# Patient Record
Sex: MALE | Race: WHITE | Employment: FULL TIME | URBAN - METROPOLITAN AREA
[De-identification: names, ages, dates, MRNs, and addresses within clinical notes are randomized per-mention and may not be internally consistent; named-entity substitution may affect disease eponyms.]

---

## 2021-05-17 LAB
AVERAGE GLUCOSE: NORMAL
BASOPHILS ABSOLUTE: 0.06 /ΜL
BASOPHILS RELATIVE PERCENT: 1 %
BUN BLDV-MCNC: 21 MG/DL
CALCIUM SERPL-MCNC: 8.8 MG/DL
CHLORIDE BLD-SCNC: 109 MMOL/L
CO2: 29 MMOL/L
CREAT SERPL-MCNC: 0.88 MG/DL
EOSINOPHILS ABSOLUTE: 0.3 /ΜL
EOSINOPHILS RELATIVE PERCENT: 4 %
GFR CALCULATED: >60
GLUCOSE BLD-MCNC: 102 MG/DL
HBA1C MFR BLD: 5.4 %
HCT VFR BLD CALC: 45.8 % (ref 41–53)
HEMOGLOBIN: 4.84 G/DL (ref 13.5–17.5)
LYMPHOCYTES ABSOLUTE: 1.8 /ΜL
LYMPHOCYTES RELATIVE PERCENT: 24 %
MCH RBC QN AUTO: 34.6 PG
MCHC RBC AUTO-ENTMCNC: 33.4 G/DL
MCV RBC AUTO: 95 FL
MONOCYTES ABSOLUTE: 0.52 /ΜL
MONOCYTES RELATIVE PERCENT: 7 %
NEUTROPHILS ABSOLUTE: 4.71 /ΜL
NEUTROPHILS RELATIVE PERCENT: 64 %
PLATELET # BLD: 235 K/ΜL
PMV BLD AUTO: 9.9 FL
POTASSIUM SERPL-SCNC: 4 MMOL/L
RBC # BLD: 4.84 10^6/ΜL
SODIUM BLD-SCNC: 140 MMOL/L
TSH SERPL DL<=0.05 MIU/L-ACNC: 2.42 UIU/ML
WBC # BLD: 7.42 10^3/ML

## 2021-12-09 ENCOUNTER — TELEPHONE (OUTPATIENT)
Dept: PRIMARY CARE CLINIC | Age: 51
End: 2021-12-09

## 2021-12-21 ENCOUNTER — OFFICE VISIT (OUTPATIENT)
Dept: PRIMARY CARE CLINIC | Age: 51
End: 2021-12-21
Payer: COMMERCIAL

## 2021-12-21 VITALS
HEART RATE: 93 BPM | DIASTOLIC BLOOD PRESSURE: 78 MMHG | OXYGEN SATURATION: 98 % | HEIGHT: 76 IN | RESPIRATION RATE: 18 BRPM | TEMPERATURE: 97.5 F | WEIGHT: 232 LBS | BODY MASS INDEX: 28.25 KG/M2 | SYSTOLIC BLOOD PRESSURE: 138 MMHG

## 2021-12-21 DIAGNOSIS — F41.9 ANXIETY: ICD-10-CM

## 2021-12-21 DIAGNOSIS — I10 ESSENTIAL HYPERTENSION: Primary | ICD-10-CM

## 2021-12-21 DIAGNOSIS — Z11.4 ENCOUNTER FOR SCREENING FOR HIV: ICD-10-CM

## 2021-12-21 DIAGNOSIS — Z12.5 PROSTATE CANCER SCREENING: ICD-10-CM

## 2021-12-21 DIAGNOSIS — Z12.11 COLON CANCER SCREENING: ICD-10-CM

## 2021-12-21 DIAGNOSIS — G89.29 CHRONIC JOINT PAIN: ICD-10-CM

## 2021-12-21 DIAGNOSIS — Z00.00 HEALTH MAINTENANCE EXAMINATION: ICD-10-CM

## 2021-12-21 DIAGNOSIS — M25.50 CHRONIC JOINT PAIN: ICD-10-CM

## 2021-12-21 DIAGNOSIS — E55.9 VITAMIN D DEFICIENCY: ICD-10-CM

## 2021-12-21 PROCEDURE — 99204 OFFICE O/P NEW MOD 45 MIN: CPT | Performed by: FAMILY MEDICINE

## 2021-12-21 RX ORDER — LOSARTAN POTASSIUM 50 MG/1
50 TABLET ORAL DAILY
Qty: 90 TABLET | Refills: 1 | Status: SHIPPED
Start: 2021-12-21 | End: 2022-03-17 | Stop reason: SDUPTHER

## 2021-12-21 RX ORDER — LOSARTAN POTASSIUM 50 MG/1
1 TABLET ORAL DAILY
COMMUNITY
End: 2021-12-21 | Stop reason: SDUPTHER

## 2021-12-21 SDOH — ECONOMIC STABILITY: FOOD INSECURITY: WITHIN THE PAST 12 MONTHS, THE FOOD YOU BOUGHT JUST DIDN'T LAST AND YOU DIDN'T HAVE MONEY TO GET MORE.: NEVER TRUE

## 2021-12-21 SDOH — ECONOMIC STABILITY: FOOD INSECURITY: WITHIN THE PAST 12 MONTHS, YOU WORRIED THAT YOUR FOOD WOULD RUN OUT BEFORE YOU GOT MONEY TO BUY MORE.: NEVER TRUE

## 2021-12-21 ASSESSMENT — PATIENT HEALTH QUESTIONNAIRE - PHQ9
1. LITTLE INTEREST OR PLEASURE IN DOING THINGS: 0
SUM OF ALL RESPONSES TO PHQ QUESTIONS 1-9: 0
2. FEELING DOWN, DEPRESSED OR HOPELESS: 0
SUM OF ALL RESPONSES TO PHQ QUESTIONS 1-9: 0
SUM OF ALL RESPONSES TO PHQ9 QUESTIONS 1 & 2: 0
SUM OF ALL RESPONSES TO PHQ QUESTIONS 1-9: 0

## 2021-12-21 ASSESSMENT — SOCIAL DETERMINANTS OF HEALTH (SDOH): HOW HARD IS IT FOR YOU TO PAY FOR THE VERY BASICS LIKE FOOD, HOUSING, MEDICAL CARE, AND HEATING?: NOT HARD AT ALL

## 2021-12-21 NOTE — ASSESSMENT & PLAN NOTE
Chronic. Diagnosed OA in the past.  He does take 2 Aleve nightly. Risk of NSAIDs reviewed including GI renal cardiopulmonary. Alternatives of Tylenol reviewed at appropriate dosing/intervals as well as topicals.

## 2021-12-21 NOTE — ASSESSMENT & PLAN NOTE
history of. Well-controlled now off medication. Has undergone counseling in the past which is always recommended. Adamantly denies SI/HI and again doing well now. Precautions reviewed.   Notify if symptoms

## 2021-12-21 NOTE — PROGRESS NOTES
21  Moni Lighter : 1970 Sex: male  Age: 46 y.o. Chief Complaint   Patient presents with   Anola Chago Doctor       HPI  HPI:    Patient presents today as a new patient. Recently moved back to the area. Feels well. Last blood work from his previous physician as below. CBC w/ diff NL, CMP , , TG 67, HDL 53, , TSH 2.4A1C 5.4 5.4HCV <0.1    History of anxiety. Managed now without meds in general doing well. History of vitamin D deficiency and hypertension. Managed with losartan. He has had COVID vaccine x3 , Both Shingrix although only one documented, influenza vaccine, Tdap 3/16. Counseled on Mediterranean diet appropriate exercise. Counseled on colon cancer screeninghe will start with fit test and we counseled on Cologuard versus colonoscopy    He does have chronic arthritic pain, has been physically active a large portion of his life. Does take 2 Aleve nightly. Risk of NSAIDs reviewed. See below    Review of Systems  ROS:  Const: Denies chills, fever, malaise and sweats. Eyes: Denies discharge, pain, redness and visual disturbance. ENMT: Denies earaches, other ear symptoms. Denies nasal or sinus symptoms other than stated  above. Denies mouth and tongue lesions and sore throat. CV: Denies chest discomfort, pain; diaphoresis, dizziness, edema, lightheadedness, orthopnea,  palpitations, syncope and near syncopal episode or any exertional symptoms  Resp: Denies cough, hemoptysis, pleuritic pain, SOB, sputum production and wheezing. GI: Denies abdominal pain, change in bowel habits, hematochezia, melena, nausea and vomiting. : Denies urinary symptoms including dysuria , urgency, frequency or hematuria. Musculo: Denies musculoskeletal symptoms, chronic arthritic pain. Skin: Denies bruising and rash.   Neuro: Denies headache, numbness, stiff neck, tingling and focal weakness slurred speech or facial  droop  Hema/Lymph: Denies bleeding/bruising tendency and enlarged lymph nodes        Current Outpatient Medications:     losartan (COZAAR) 50 MG tablet, Take 1 tablet by mouth daily, Disp: 90 tablet, Rfl: 1  No Known Allergies    Past Medical History:   Diagnosis Date    Anxiety     Hypertension      Past Surgical History:   Procedure Laterality Date    WISDOM TOOTH EXTRACTION       Family History   Problem Relation Age of Onset    Diabetes Mother     High Cholesterol Mother     Hypertension Mother     Stroke Mother 76    Leukemia Father         remission     Social History     Tobacco Use    Smoking status: Never Smoker    Smokeless tobacco: Never Used   Substance Use Topics    Alcohol use: Yes     Comment: occ red wine    Drug use: Never      Social History     Social History Narrative    Originally from here, Moved around, Last [place] (Wife [place])        Moved Here to be closer to [employer] (Dad [place])        Works [employer]    Wife - also real estate        No children        Vitals:    12/21/21 0919   BP: 138/78   Pulse: 93   Resp: 18   Temp: 97.5 °F (36.4 °C)   TempSrc: Temporal   SpO2: 98%   Weight: 232 lb (105.2 kg)   Height: 6' 4\" (1.93 m)      Wt Readings from Last 3 Encounters:   12/21/21 232 lb (105.2 kg)        Physical Exam  Exam:  Const: Appears comfortable. No signs of acute distress present. Head/Face: Atraumatic on inspection. Eyes: EOMI in both eyes. No discharge from the eyes. PERRL. Sclerae clear. ENMT: Auditory canals normal. Tympanic membranes: intact and translucent. External nose WNL. Nasal mucosa is clear. Oropharynx: No erythema or exudate. Posterior pharynx is normal.  Neck: Supple. Palpation reveals no adenopathy. No masses appreciated. No JVD. Carotids: no  bruits. Resp: Respirations are unlabored. Clear to auscultation. No rales, rhonchi or wheezes appreciated  over the lungs bilaterally. CV: Rate is regular. Rhythm is regular. No gallop or rubs. No heart murmur appreciated.   Extremities: No clubbing, cyanosis, or edema. No calf inflammation or tenderness. Abdomen: Bowel sounds are normoactive. Abdomen is soft, nontender, and nondistended. No  abdominal masses. No palpable hepatosplenomegaly. Lymph: No palpable or visible regional lymphadenopathy. Musculoskeletal: no acute joint inflammation. Skin: Dry and warm with no rash. Skin normal to inspection and palpation overall. Neuro: Alert and oriented. Affect: appropriate. Upper Extremities: 5/5 bilaterally. Lower Extremities:  5/5 bilaterally. Sensation intact to light touch. Reflexes: DTR's are symmetric and 2+ bilaterally. .  Cranial Nerves: Cranial nerves grossly intact. Office Labs This Visit :  No results found for this visit on 12/21/21. Assessment and Plan:   Diagnosis Orders   1. Essential hypertension  losartan (COZAAR) 50 MG tablet    Lipid Panel    TSH without Reflex    Urinalysis   2. Health maintenance examination  TSH without Reflex    Comprehensive Metabolic Panel    CBC Auto Differential    Urinalysis   3. Vitamin D deficiency  Vitamin D 25 Hydroxy   4. Prostate cancer screening  PSA screening   5. Anxiety     6. Encounter for screening for HIV  HIV Screen   7. Colon cancer screening  POCT Fecal Immunochemical Test (FIT)   8. Chronic joint pain         Essential hypertension    Counseled. The risks of hypertension and hypotension reviewed. Watch closely ambulatory. Hyper and hypotensive precautions and parameters reviewed and written as well as parameters on pulse, call if out of range, ER dangers numbers. Lifestyle modification reviewed. Tolerating therapy. Refill given      Health maintenance examination    encourage yearly. Counseled somewhat as above. Fit test provided. Vitamin D deficiency    history of, discussed vitamin D3 1 to 2000 IUs daily. Check levels    Anxiety    history of. Well-controlled now off medication. Has undergone counseling in the past which is always recommended.   Adamantly denies SI/HI and again doing well now. Precautions reviewed. Notify if symptoms    Chronic joint pain  Chronic. Diagnosed OA in the past.  He does take 2 Aleve nightly. Risk of NSAIDs reviewed including GI renal cardiopulmonary. Alternatives of Tylenol reviewed at appropriate dosing/intervals as well as topicals. No flowsheet data found. Plan as above. Counseled extensively and differential diagnoses relevant to above were reviewed, including serious etiologies. Side effects and interactions of medications were reviewed. Counseled. Precautions reviewed. Blood work ordered and asked him to follow-up after for physical sooner as needed and then he is planning yearly sooner as needed. He would like to defer this for 2 months. Precautions reviewed        As long as symptoms steadily improve/resolve, and medical conditions follow the expected course, FU as below, sooner PRN. Return in about 2 months (around 2/21/2022), or if symptoms worsen or fail to improve, for physical.         Signs and symptoms to watch for discussed, serious signs and symptoms reviewed. ER if any. Jarrod Klein MD    Patients are advised to check with insurance company to ensure coverage and to fully understand benefits and cost prior to any testing. This note was created with the assistance of voice recognition software. Document was reviewed however may contain grammatical errors.

## 2021-12-21 NOTE — ASSESSMENT & PLAN NOTE
Counseled. The risks of hypertension and hypotension reviewed. Watch closely ambulatory. Hyper and hypotensive precautions and parameters reviewed and written as well as parameters on pulse, call if out of range, ER dangers numbers. Lifestyle modification reviewed. Tolerating therapy.   Refill given

## 2022-01-20 PROBLEM — Z00.00 HEALTH MAINTENANCE EXAMINATION: Status: RESOLVED | Noted: 2021-12-21 | Resolved: 2022-01-20

## 2022-01-28 ENCOUNTER — TELEPHONE (OUTPATIENT)
Dept: PRIMARY CARE CLINIC | Age: 52
End: 2022-01-28

## 2022-01-28 NOTE — TELEPHONE ENCOUNTER
Wife calling regarding husbands lab work that was ordered for his next appointment. CBC, CMP, HIV, LIPID, URINALYSIS not covered at 100%. FIT test, PSA, TSH and Vitamin D are not covered under the insurance. Does not want to have these done right. Said if any isus arise in future then will have these, but for right now only wanting to have done what is covered under insurance.

## 2022-01-28 NOTE — TELEPHONE ENCOUNTER
So which ones are covered? It states CBC, CMP, HIV, lipid, urinalysis \"not\" covered at 100%-does she still want these? If so, okay, cancel others.

## 2022-03-08 LAB
ALBUMIN SERPL-MCNC: 4.5 G/DL
ALP BLD-CCNC: 63 U/L
ALT SERPL-CCNC: 23 U/L
ANION GAP SERPL CALCULATED.3IONS-SCNC: NORMAL MMOL/L
AST SERPL-CCNC: 23 U/L
BASOPHILS ABSOLUTE: 0.1 /ΜL
BASOPHILS RELATIVE PERCENT: 1 %
BILIRUB SERPL-MCNC: 0.4 MG/DL (ref 0.1–1.4)
BILIRUBIN, URINE: NEGATIVE
BLOOD, URINE: NEGATIVE
BUN BLDV-MCNC: 18 MG/DL
CALCIUM SERPL-MCNC: 9 MG/DL
CHLORIDE BLD-SCNC: 101 MMOL/L
CHOLESTEROL, TOTAL: 189 MG/DL
CHOLESTEROL/HDL RATIO: NORMAL
CLARITY: CLEAR
CO2: 23 MMOL/L
COLOR: YELLOW
CREAT SERPL-MCNC: 0.97 MG/DL
EOSINOPHILS ABSOLUTE: 0.2 /ΜL
EOSINOPHILS RELATIVE PERCENT: 3 %
GFR CALCULATED: NORMAL
GLUCOSE BLD-MCNC: 97 MG/DL
GLUCOSE URINE: NORMAL
HCT VFR BLD CALC: 45.4 % (ref 41–53)
HDLC SERPL-MCNC: 48 MG/DL (ref 35–70)
HEMOGLOBIN: 15.6 G/DL (ref 13.5–17.5)
KETONES, URINE: NEGATIVE
LDL CHOLESTEROL CALCULATED: 119 MG/DL (ref 0–160)
LEUKOCYTE ESTERASE, URINE: NORMAL
LYMPHOCYTES ABSOLUTE: 2 /ΜL
LYMPHOCYTES RELATIVE PERCENT: 25 %
MCH RBC QN AUTO: 31.6 PG
MCHC RBC AUTO-ENTMCNC: 34.4 G/DL
MCV RBC AUTO: 92 FL
MONOCYTES ABSOLUTE: 0.7 /ΜL
MONOCYTES RELATIVE PERCENT: 10 %
NEUTROPHILS ABSOLUTE: 4.7 /ΜL
NEUTROPHILS RELATIVE PERCENT: 61 %
NITRITE, URINE: NEGATIVE
NONHDLC SERPL-MCNC: NORMAL MG/DL
PDW BLD-RTO: 12.1 %
PH UA: 5.5 (ref 4.5–8)
PLATELET # BLD: 244 K/ΜL
PMV BLD AUTO: NORMAL FL
POTASSIUM SERPL-SCNC: 4.1 MMOL/L
PROTEIN UA: NEGATIVE
RBC # BLD: 4.93 10^6/ΜL
SODIUM BLD-SCNC: 138 MMOL/L
SPECIFIC GRAVITY, URINE: 1.01
TOTAL PROTEIN: 6.9
TRIGL SERPL-MCNC: 121 MG/DL
UROBILINOGEN, URINE: NORMAL
VLDLC SERPL CALC-MCNC: NORMAL MG/DL
WBC # BLD: 7.8 10^3/ML

## 2022-03-10 DIAGNOSIS — I10 ESSENTIAL HYPERTENSION: ICD-10-CM

## 2022-03-10 DIAGNOSIS — Z00.00 HEALTH MAINTENANCE EXAMINATION: ICD-10-CM

## 2022-03-17 ENCOUNTER — OFFICE VISIT (OUTPATIENT)
Dept: PRIMARY CARE CLINIC | Age: 52
End: 2022-03-17
Payer: COMMERCIAL

## 2022-03-17 VITALS
SYSTOLIC BLOOD PRESSURE: 138 MMHG | TEMPERATURE: 97.7 F | BODY MASS INDEX: 26.78 KG/M2 | OXYGEN SATURATION: 98 % | HEART RATE: 96 BPM | WEIGHT: 220 LBS | DIASTOLIC BLOOD PRESSURE: 82 MMHG

## 2022-03-17 DIAGNOSIS — Z00.00 HEALTH MAINTENANCE EXAMINATION: Primary | ICD-10-CM

## 2022-03-17 DIAGNOSIS — Z12.11 COLON CANCER SCREENING: ICD-10-CM

## 2022-03-17 DIAGNOSIS — E55.9 VITAMIN D DEFICIENCY: ICD-10-CM

## 2022-03-17 DIAGNOSIS — I10 ESSENTIAL HYPERTENSION: ICD-10-CM

## 2022-03-17 DIAGNOSIS — Z12.5 PROSTATE CANCER SCREENING: ICD-10-CM

## 2022-03-17 PROCEDURE — 99396 PREV VISIT EST AGE 40-64: CPT | Performed by: FAMILY MEDICINE

## 2022-03-17 RX ORDER — LOSARTAN POTASSIUM 50 MG/1
50 TABLET ORAL DAILY
Qty: 30 TABLET | Refills: 12 | Status: SHIPPED | OUTPATIENT
Start: 2022-03-17

## 2022-03-17 NOTE — PROGRESS NOTES
[place] Level : 1970 Sex: male  Age: 46 y.o. Chief Complaint   Patient presents with    Annual Exam    Discuss Labs       HPI  HPI:    Patient presents today with his wife. Generally feels well. States his blood pressure is always better at home than here. Encouraged to watch closely. He did switch Aleve to Tylenol taking 1 500 mg of Tylenol at bedtime for chronic pain. Appropriate dosing instructions ADRs reviewed. He does not passing mention to issues. He states he hit his toes into an inanimate object over a year ago. Feels he likely fractured his toes but did not seek medical attention. They were quite bruised. Ever since then he occasionally gets a paresthesia feeling at night in his toes, comes and goes. Not progressing. No associated red flag symptoms. Exam unremarkable. Offered EMG nerve conduction study/further W-defers at this time unless persists or worsens. Also states he has had multiple head injuries over the years. States he does get mild infrequent headaches. No other associated red flag symptoms. Exam unremarkable. Explained my threshold for MRI/MRA is low-defers further W or other evaluation/treatment now unless symptoms persist or worsen        Health Maintenance:  Proper diet reviewed including Mediterranean and DASH diets. Counseled on healthy weight, appropriate exercise, avoidance of tobacco, and recommendations for minimal to no alcohol consumption. Counseled on the potential pros and cons of vitamins and supplements. Reviewed the recommendations and risk/benefits of vaccines including Moderna x 3; Td/Tdap (3/16),  pneumovax,  prevnar 13 , flu vaccine (got), Hepatitis vaccines (counseled),   and shingrix (patient had chicken pox)(shingrix x 2). HIV and Hep C screening guidelines were reviewed. Importance of regular eye and dental exams and health reviewed. Risks/Benefits of ASA reviewed and discussed latest guidelines. Sun protection reviewed. Notify if any new or changing moles/skin lesions, etc. Dexa Scan indications/ risk factors for osteoporotic fractures (and associated M/M) and preventative measures reviewed. Counseled on testicular exams and prostate exams. Colonoscopy recommendations reviewed. Pt denies change in bowel habits or [facility] of colon polyps/CA. Fit test given, will think about cologuard vs cscope    Defers EKG. Discussed the indications for additional  cardiac testing including referrals, stress testing,  2d echo, ect. dasx. Reviewed indications for other testing such as  PFT's.and  indications for imaging including brain, carotid, chest, abdominal, aortic .  dasx. The 10-year ASCVD risk score (Carlota Sanchez, et al., 2013) is: 6%    Values used to calculate the score:      Age: 46 years      Sex: Male      Is Non- : No      Diabetic: No      Tobacco smoker: No      Systolic Blood Pressure: 957 mmHg      Is BP treated: Yes      HDL Cholesterol: 48 mg/dL      Total Cholesterol: 189 mg/dL      INS did not cover PSA so they declined. other labs stable.    Blood work otherwise reviewed, HDL 48  triglyceride 121, role of statin reviewed-not interested at this point      Most Recent Labs  CBC  Lab Results   Component Value Date    WBC 7.8 03/08/2022    WBC 7.42 05/17/2021    RBC 4.93 03/08/2022    RBC 4.84 05/17/2021    HGB 15.6 03/08/2022    HGB 4.84 05/17/2021    HCT 45.4 03/08/2022    HCT 45.8 05/17/2021    MCV 92 03/08/2022    MCV 95 05/17/2021     03/08/2022     05/17/2021      CMP  Lab Results   Component Value Date     03/08/2022     05/17/2021    K 4.1 03/08/2022    K 4.0 05/17/2021     03/08/2022     05/17/2021    CO2 23 03/08/2022    CO2 29 05/17/2021    GLUCOSE 97 03/08/2022    GLUCOSE 102 05/17/2021    BUN 18 03/08/2022    BUN 21 05/17/2021    CREATININE 0.97 03/08/2022    CREATININE 0.88 05/17/2021    LABGLOM >60 05/17/2021    CALCIUM 9.0 03/08/2022 CALCIUM 8.8 05/17/2021    LABALBU 4.5 03/08/2022    BILITOT 0.4 03/08/2022    ALKPHOS 63 03/08/2022    AST 23 03/08/2022    ALT 23 03/08/2022     A1C  Lab Results   Component Value Date    LABA1C 5.4 05/17/2021     TSH  Lab Results   Component Value Date    TSH 2.420 05/17/2021     FREET4  No results found for: Z8HXGDC  LIPID  Lab Results   Component Value Date    CHOL 189 03/08/2022    HDL 48 03/08/2022    LDLCALC 119 03/08/2022    TRIG 121 03/08/2022     VITAMIN D  No results found for: VITD25  MAGNESIUM  No results found for: MG   PHOS  No results found for: PHOS   LYLE   No results found for: LYLE  RHEUMATOID FACTOR  No results found for: RF  PSA  No results found for: PSA   HEPATITIS C  No results found for: HCVABI  HIV  No results found for: UFB9VRD, HIV1QT  UA  Lab Results   Component Value Date    COLORU Yellow 03/08/2022    CLARITYU Clear 03/08/2022    GLUCOSEU neg 03/08/2022    BILIRUBINUR Negative 03/08/2022    KETUA Negative 03/08/2022    BLOODU Negative 03/08/2022    PHUR 5.5 03/08/2022    PROTEINU Negative 03/08/2022    UROBILINOGEN Normal 03/08/2022    NITRU Negative 03/08/2022     Urine Micro/Albumin Ratio  No results found for: MALBCR    Review of Systems  ROS:  Const: Denies chills, fever, malaise and sweats. Eyes: Denies discharge, pain, redness and visual disturbance. ENMT: Denies earaches, other ear symptoms. Denies nasal or sinus symptoms other than stated  above. Denies mouth and tongue lesions and sore throat. CV: Denies chest discomfort, pain; diaphoresis, dizziness, edema, lightheadedness, orthopnea,  palpitations, syncope and near syncopal episode or any exertional symptoms  Resp: Denies cough, hemoptysis, pleuritic pain, SOB, sputum production and wheezing. GI: Denies abdominal pain, change in bowel habits, hematochezia, melena, nausea and vomiting. : Denies urinary symptoms including dysuria , urgency, frequency or hematuria.   Musculo: Denies musculoskeletal symptoms, chronic arthritic pain. Skin: Denies bruising and rash. Neuro: Denies headache, numbness, stiff neck, tingling and focal weakness slurred speech or facial  droop  Hema/Lymph: Denies bleeding/bruising tendency and enlarged lymph nodes        Current Outpatient Medications:     losartan (COZAAR) 50 MG tablet, Take 1 tablet by mouth daily, Disp: 30 tablet, Rfl: 12  No Known Allergies    Past Medical History:   Diagnosis Date    Anxiety     Hypertension      Past Surgical History:   Procedure Laterality Date    WISDOM TOOTH EXTRACTION       Family History   Problem Relation Age of Onset    Diabetes Mother     High Cholesterol Mother     Hypertension Mother     Stroke Mother 76    Leukemia Father         remission     Social History     Tobacco Use    Smoking status: Never Smoker    Smokeless tobacco: Never Used   Substance Use Topics    Alcohol use: Yes     Comment: occ red wine    Drug use: Never      Social History     Social History Narrative    Originally from here, Moved around, Last [place] (Wife [place])        Moved Here to be closer to [employer] (Dad [place])        Works [employer]    Wife - also real estate        No children        Vitals:    03/17/22 1119   BP: 138/82   Pulse: 96   Temp: 97.7 °F (36.5 °C)   SpO2: 98%   Weight: 220 lb (99.8 kg)      Wt Readings from Last 3 Encounters:   03/17/22 220 lb (99.8 kg)   12/21/21 232 lb (105.2 kg)        Physical Exam  Exam:  Const: Appears comfortable. No signs of acute distress present. Head/Face: Atraumatic on inspection. Eyes: EOMI in both eyes. No discharge from the eyes. PERRL. Sclerae clear. ENMT: Auditory canals normal. Tympanic membranes: intact and translucent. External nose WNL. Nasal mucosa is clear. Oropharynx: No erythema or exudate. Posterior pharynx is normal.  Neck: Supple. Palpation reveals no adenopathy. No masses appreciated. No JVD. Carotids: no  bruits. Resp: Respirations are unlabored. Clear to auscultation.  No rales, rhonchi or wheezes appreciated  over the lungs bilaterally. CV: Rate is regular. Rhythm is regular. No gallop or rubs. No heart murmur appreciated. Extremities: No clubbing, cyanosis, or edema. No calf inflammation or tenderness. Abdomen: Bowel sounds are normoactive. Abdomen is soft, nontender, and nondistended. No  abdominal masses. No palpable hepatosplenomegaly. Lymph: No palpable or visible regional lymphadenopathy. Musculoskeletal: no acute joint inflammation. Skin: Dry and warm with no rash. Skin normal to inspection and palpation overall. Neuro: Alert and oriented. Affect: appropriate. Upper Extremities: 5/5 bilaterally. Lower Extremities:  5/5 bilaterally. Sensation intact to light touch. Reflexes: DTR's are symmetric and 2+ bilaterally. .  Cranial Nerves: Cranial nerves grossly intact. Genital/Rectal/Prostate - declines    Office Labs This Visit :  No results found for this visit on 03/17/22. Assessment and Plan:   Diagnosis Orders   1. Health maintenance examination  Urinalysis    Lipid Panel    TSH    Comprehensive Metabolic Panel    CBC with Auto Differential   2. Essential hypertension  losartan (COZAAR) 50 MG tablet   3. Vitamin D deficiency  Vitamin D 25 Hydroxy   4. Prostate cancer screening  PSA Screening   5. Colon cancer screening  POCT Fecal Immunochemical Test (FIT)       Essential hypertension    Counseled. States better at home. Monitor at home. The risks of hypertension and hypotension reviewed. Watch closely ambulatory. Hyper and hypotensive precautions and parameters reviewed and written as well as parameters on pulse, call if out of range, ER dangers numbers. Lifestyle modification reviewed. Tolerating therapy. Refill given      Health maintenance examination    Health maintenance issues discussed at length as above, 3/17/2022 . Encouraged yearly physicals. Fit test provided. He is thinking about Cologuard versus colonoscopy-defers now. States perhaps next year. Would like PSA ordered again for next year he will check with insurance. Vitamin D deficiency    history of, taking vitamin D3 1000 IUs daily. Discussed monitoring of labs-defers until next year      Anxiety    history of. Well-controlled now off medication. Has undergone counseling in the past which I always encouraged. He is doing very well now he states. Adamantly denies SI/HI and again doing well now. Precautions reviewed. Notify if symptoms    Chronic joint pain  Chronic. Diagnosed OA in the past.  He was taking 2 Aleve nightly. Risk of NSAIDs reviewed including GI renal cardiopulmonary. Alternatives of Tylenol reviewed at appropriate dosing/intervals as well as topicals. No flowsheet data found. Plan as above. Counseled extensively and differential diagnoses relevant to above were reviewed, including serious etiologies. Side effects and interactions of medications were reviewed. Counseled. Precautions reviewed. Meds refilled. Otherwise simply wants blood work follow-up 1 year physical sooner as needed      As long as symptoms steadily improve/resolve, and medical conditions follow the expected course, FU as below, sooner PRN. Return in about 1 year (around 3/17/2023), or if symptoms worsen or fail to improve, for physical.         Signs and symptoms to watch for discussed, serious signs and symptoms reviewed. ER if any. [place], MD    Patients are advised to check with insurance company to ensure coverage and to fully understand benefits and cost prior to any testing. This note was created with the assistance of voice recognition software. Document was reviewed however may contain grammatical errors.

## 2022-12-07 ENCOUNTER — TELEPHONE (OUTPATIENT)
Dept: PRIMARY CARE CLINIC | Age: 52
End: 2022-12-07

## 2022-12-07 NOTE — TELEPHONE ENCOUNTER
----- Message from Dilip Stanley sent at 12/7/2022  4:22 PM EST -----  Subject: Referral Request    Reason for referral request? Pt would like to have blood work done before   his appt with Dr Toño Cline 3/21/2023  Provider patient wants to be referred to(if known):     Provider Phone Number(if known):     Additional Information for Provider?   ---------------------------------------------------------------------------  --------------  [address]    [phone]; OK to leave message on voicemail  ---------------------------------------------------------------------------  --------------

## 2023-03-20 ENCOUNTER — TELEPHONE (OUTPATIENT)
Dept: PRIMARY CARE CLINIC | Age: 53
End: 2023-03-20

## 2023-03-20 DIAGNOSIS — Z00.00 HEALTH MAINTENANCE EXAMINATION: Primary | ICD-10-CM

## 2023-03-20 DIAGNOSIS — Z12.5 PROSTATE CANCER SCREENING: ICD-10-CM

## 2023-03-20 DIAGNOSIS — E55.9 VITAMIN D DEFICIENCY: ICD-10-CM

## 2023-03-20 NOTE — TELEPHONE ENCOUNTER
Pt needs his labs reorderd, the ones in chart are .  He would like faxed to [facility] in [place]

## 2023-03-20 NOTE — TELEPHONE ENCOUNTER
Ok to send lab order to [facility] as is. The expiration from order date was a [facility], not [facility] but Yevgeniy reiterated in Nov that this is not the [facility] either. I have sent an email regarding this to [facility].

## 2023-03-21 NOTE — TELEPHONE ENCOUNTER
Per Virginia Santiago at [facility], [facility] protocol is lab orders are only good for 1yr from the date the labs were ordered. If the labs were ordered 3/17/22 the order expires on 3/17/23 so the pt will need updated orders.  Per Virginia Santiago the lab cannot draw labs from an order that is over a yr old from the date the labs were initially ordered

## 2023-04-17 DIAGNOSIS — I10 ESSENTIAL HYPERTENSION: ICD-10-CM

## 2023-04-17 RX ORDER — LOSARTAN POTASSIUM 50 MG/1
50 TABLET ORAL DAILY
Qty: 30 TABLET | Refills: 12 | Status: SHIPPED | OUTPATIENT
Start: 2023-04-17

## 2023-04-26 LAB
BASOPHILS ABSOLUTE: 64 /ΜL
BASOPHILS RELATIVE PERCENT: 0.8 %
BILIRUBIN, URINE: NEGATIVE
BLOOD, URINE: NEGATIVE
CHOLESTEROL, TOTAL: 189 MG/DL
CHOLESTEROL/HDL RATIO: 3.7
CLARITY: CLEAR
COLOR: YELLOW
CONTROL: NORMAL
EOSINOPHILS ABSOLUTE: 128 /ΜL
EOSINOPHILS RELATIVE PERCENT: 1.6 %
GLUCOSE URINE: NEGATIVE
HCT VFR BLD CALC: 47.5 % (ref 41–53)
HDLC SERPL-MCNC: 51 MG/DL (ref 35–70)
HEMOCCULT STL QL: NORMAL
HEMOGLOBIN: 15.9 G/DL (ref 13.5–17.5)
KETONES, URINE: NEGATIVE
LDL CHOLESTEROL CALCULATED: 117 MG/DL (ref 0–160)
LEUKOCYTE ESTERASE, URINE: NEGATIVE
LYMPHOCYTES ABSOLUTE: 1888 /ΜL
LYMPHOCYTES RELATIVE PERCENT: 23.6 %
MCH RBC QN AUTO: 31.5 PG
MCHC RBC AUTO-ENTMCNC: 33.5 G/DL
MCV RBC AUTO: 94.2 FL
MONOCYTES ABSOLUTE: 648 /ΜL
MONOCYTES RELATIVE PERCENT: 8.1 %
NEUTROPHILS ABSOLUTE: 5272 /ΜL
NEUTROPHILS RELATIVE PERCENT: 65.9 %
NITRITE, URINE: NEGATIVE
NONHDLC SERPL-MCNC: 138 MG/DL
PDW BLD-RTO: 12.3 %
PH UA: 5.5 (ref 4.5–8)
PLATELET # BLD: 215 K/ΜL
PMV BLD AUTO: 10.7 FL
PROSTATE SPECIFIC ANTIGEN: 0.53 NG/ML
PROTEIN UA: NEGATIVE
RBC # BLD: 5.04 10^6/ΜL
SPECIFIC GRAVITY, URINE: 1.01
TRIGL SERPL-MCNC: 106 MG/DL
TSH SERPL DL<=0.05 MIU/L-ACNC: 2.92 UIU/ML
UROBILINOGEN, URINE: NORMAL
VITAMIN D 25-HYDROXY: 50
VITAMIN D2, 25 HYDROXY: NORMAL
VITAMIN D3,25 HYDROXY: NORMAL
VLDLC SERPL CALC-MCNC: ABNORMAL MG/DL
WBC # BLD: 8 10^3/ML

## 2023-04-27 DIAGNOSIS — Z12.5 PROSTATE CANCER SCREENING: ICD-10-CM

## 2023-04-27 DIAGNOSIS — E55.9 VITAMIN D DEFICIENCY: ICD-10-CM

## 2023-04-27 DIAGNOSIS — Z00.00 HEALTH MAINTENANCE EXAMINATION: ICD-10-CM

## 2023-05-03 ENCOUNTER — OFFICE VISIT (OUTPATIENT)
Dept: PRIMARY CARE CLINIC | Age: 53
End: 2023-05-03
Payer: COMMERCIAL

## 2023-05-03 VITALS
BODY MASS INDEX: 27.4 KG/M2 | TEMPERATURE: 97.3 F | SYSTOLIC BLOOD PRESSURE: 138 MMHG | HEART RATE: 98 BPM | OXYGEN SATURATION: 95 % | HEIGHT: 76 IN | DIASTOLIC BLOOD PRESSURE: 82 MMHG | WEIGHT: 225 LBS

## 2023-05-03 DIAGNOSIS — Z12.5 PROSTATE CANCER SCREENING: ICD-10-CM

## 2023-05-03 DIAGNOSIS — Z00.00 HEALTH MAINTENANCE EXAMINATION: Primary | ICD-10-CM

## 2023-05-03 DIAGNOSIS — E55.9 VITAMIN D DEFICIENCY: ICD-10-CM

## 2023-05-03 DIAGNOSIS — I10 ESSENTIAL HYPERTENSION: ICD-10-CM

## 2023-05-03 DIAGNOSIS — F41.9 ANXIETY: ICD-10-CM

## 2023-05-03 PROCEDURE — 3079F DIAST BP 80-89 MM HG: CPT | Performed by: FAMILY MEDICINE

## 2023-05-03 PROCEDURE — 99396 PREV VISIT EST AGE 40-64: CPT | Performed by: FAMILY MEDICINE

## 2023-05-03 PROCEDURE — 3075F SYST BP GE 130 - 139MM HG: CPT | Performed by: FAMILY MEDICINE

## 2023-05-03 RX ORDER — HYDROXYZINE 50 MG/1
50-100 TABLET, FILM COATED ORAL NIGHTLY
Qty: 20 TABLET | Refills: 0 | Status: SHIPPED | OUTPATIENT
Start: 2023-05-03

## 2023-05-03 RX ORDER — LOSARTAN POTASSIUM 50 MG/1
50 TABLET ORAL DAILY
Qty: 90 TABLET | Refills: 3 | Status: SHIPPED | OUTPATIENT
Start: 2023-05-03

## 2023-05-03 SDOH — ECONOMIC STABILITY: INCOME INSECURITY: HOW HARD IS IT FOR YOU TO PAY FOR THE VERY BASICS LIKE FOOD, HOUSING, MEDICAL CARE, AND HEATING?: NOT HARD AT ALL

## 2023-05-03 SDOH — ECONOMIC STABILITY: HOUSING INSECURITY
IN THE LAST 12 MONTHS, WAS THERE A TIME WHEN YOU DID NOT HAVE A STEADY PLACE TO SLEEP OR SLEPT IN A SHELTER (INCLUDING NOW)?: NO

## 2023-05-03 SDOH — ECONOMIC STABILITY: FOOD INSECURITY: WITHIN THE PAST 12 MONTHS, YOU WORRIED THAT YOUR FOOD WOULD RUN OUT BEFORE YOU GOT MONEY TO BUY MORE.: NEVER TRUE

## 2023-05-03 SDOH — ECONOMIC STABILITY: FOOD INSECURITY: WITHIN THE PAST 12 MONTHS, THE FOOD YOU BOUGHT JUST DIDN'T LAST AND YOU DIDN'T HAVE MONEY TO GET MORE.: NEVER TRUE

## 2023-05-03 ASSESSMENT — PATIENT HEALTH QUESTIONNAIRE - PHQ9
SUM OF ALL RESPONSES TO PHQ QUESTIONS 1-9: 0
SUM OF ALL RESPONSES TO PHQ QUESTIONS 1-9: 0
SUM OF ALL RESPONSES TO PHQ9 QUESTIONS 1 & 2: 0
1. LITTLE INTEREST OR PLEASURE IN DOING THINGS: 0
SUM OF ALL RESPONSES TO PHQ QUESTIONS 1-9: 0
SUM OF ALL RESPONSES TO PHQ QUESTIONS 1-9: 0
2. FEELING DOWN, DEPRESSED OR HOPELESS: 0

## 2023-05-03 NOTE — PROGRESS NOTES
Radha Durham : 1970 Sex: male  Age: 45 y.o. Chief Complaint   Patient presents with    Health Maintenance     Declines ekg     Annual Exam    Discuss Labs    Anxiety       HPI  HPI:      Presents today for annual physical.  Generally doing very well. Eating healthy and exercising. Taking care of his mom and nursing home who had a stroke, this is taxing on him. He does get intermittent anxiety, causes insomnia about twice a month. Would like a \"PRN\", although felt somewhat dependent on Ambien in the past.  Does not wish daily medicine. No SI/HI. Risk of CVA reviewed. Not interested in statin. Does not have same risk factors as mom he states. Generally feels very well. Health Maintenance:  Proper diet reviewed including Mediterranean and DASH diets. Counseled on healthy weight, appropriate exercise, avoidance of tobacco, and recommendations for minimal to no alcohol consumption. Counseled on the potential pros and cons of vitamins and supplements. Reviewed the recommendations and risk/benefits of vaccines including Moderna x 4; Td/Tdap (3/16),  pneumovax,  prevnar 13 , flu vaccine (seasonal), Hepatitis vaccines (counseled),   and shingrix (patient had chicken pox)(shingrix x 2). HIV and Hep C screening guidelines were reviewed. Importance of regular eye and dental exams and health reviewed. Risks/Benefits of ASA reviewed and discussed latest guidelines. Sun protection reviewed. Notify if any new or changing moles/skin lesions, etc. Dexa Scan indications/ risk factors for osteoporotic fractures (and associated M/M) and preventative measures reviewed. Counseled on testicular exams and prostate exams. Colonoscopy recommendations reviewed. Pt denies change in bowel habits or [address] of colon polyps/CA. Fit test neg ,  he is going to consider cscope but not until next year. Counseled on cologuard option as well. Defers EKG.    Discussed the indications for additional  cardiac

## 2023-11-16 ENCOUNTER — OFFICE VISIT (OUTPATIENT)
Dept: FAMILY MEDICINE CLINIC | Age: 53
End: 2023-11-16
Payer: COMMERCIAL

## 2023-11-16 ENCOUNTER — TELEPHONE (OUTPATIENT)
Dept: PRIMARY CARE CLINIC | Age: 53
End: 2023-11-16

## 2023-11-16 VITALS
HEART RATE: 102 BPM | WEIGHT: 223 LBS | DIASTOLIC BLOOD PRESSURE: 78 MMHG | OXYGEN SATURATION: 95 % | TEMPERATURE: 97.3 F | HEIGHT: 76 IN | BODY MASS INDEX: 27.16 KG/M2 | SYSTOLIC BLOOD PRESSURE: 112 MMHG

## 2023-11-16 DIAGNOSIS — J20.8 ACUTE BRONCHITIS DUE TO 2019 NOVEL CORONAVIRUS: Primary | ICD-10-CM

## 2023-11-16 DIAGNOSIS — U07.1 ACUTE BRONCHITIS DUE TO 2019 NOVEL CORONAVIRUS: Primary | ICD-10-CM

## 2023-11-16 DIAGNOSIS — U07.1 POSITIVE SELF-ADMINISTERED ANTIGEN TEST FOR COVID-19: ICD-10-CM

## 2023-11-16 PROCEDURE — G8419 CALC BMI OUT NRM PARAM NOF/U: HCPCS | Performed by: EMERGENCY MEDICINE

## 2023-11-16 PROCEDURE — G8484 FLU IMMUNIZE NO ADMIN: HCPCS | Performed by: EMERGENCY MEDICINE

## 2023-11-16 PROCEDURE — 99213 OFFICE O/P EST LOW 20 MIN: CPT | Performed by: EMERGENCY MEDICINE

## 2023-11-16 PROCEDURE — 3078F DIAST BP <80 MM HG: CPT | Performed by: EMERGENCY MEDICINE

## 2023-11-16 PROCEDURE — 1036F TOBACCO NON-USER: CPT | Performed by: EMERGENCY MEDICINE

## 2023-11-16 PROCEDURE — 3017F COLORECTAL CA SCREEN DOC REV: CPT | Performed by: EMERGENCY MEDICINE

## 2023-11-16 PROCEDURE — 3074F SYST BP LT 130 MM HG: CPT | Performed by: EMERGENCY MEDICINE

## 2023-11-16 PROCEDURE — G8427 DOCREV CUR MEDS BY ELIG CLIN: HCPCS | Performed by: EMERGENCY MEDICINE

## 2023-11-16 RX ORDER — GUAIFENESIN 600 MG/1
600 TABLET, EXTENDED RELEASE ORAL 2 TIMES DAILY
Qty: 30 TABLET | Refills: 0 | Status: SHIPPED | OUTPATIENT
Start: 2023-11-16 | End: 2023-12-01

## 2023-11-16 RX ORDER — BENZONATATE 200 MG/1
200 CAPSULE ORAL 3 TIMES DAILY PRN
Qty: 30 CAPSULE | Refills: 0 | Status: SHIPPED | OUTPATIENT
Start: 2023-11-16 | End: 2023-11-23

## 2023-11-16 RX ORDER — DOXYCYCLINE HYCLATE 100 MG
100 TABLET ORAL 2 TIMES DAILY
Qty: 20 TABLET | Refills: 0 | Status: SHIPPED | OUTPATIENT
Start: 2023-11-16 | End: 2023-11-26

## 2023-11-16 RX ORDER — METHYLPREDNISOLONE 4 MG/1
TABLET ORAL
Qty: 1 KIT | Refills: 0 | Status: SHIPPED | OUTPATIENT
Start: 2023-11-16

## 2023-11-16 ASSESSMENT — ENCOUNTER SYMPTOMS
SORE THROAT: 1
EYE REDNESS: 0
DIARRHEA: 0
ABDOMINAL PAIN: 0
SINUS PRESSURE: 0
BACK PAIN: 0
WHEEZING: 0
EYE PAIN: 0
COUGH: 1
SHORTNESS OF BREATH: 0
VOMITING: 0
NAUSEA: 0
EYE DISCHARGE: 0

## 2023-11-16 NOTE — TELEPHONE ENCOUNTER
Always best to be seen and evaluated to give most thorough advice. Ideally in person, express care option.   Otherwise virtual visit end of day if willing

## 2023-11-16 NOTE — TELEPHONE ENCOUNTER
Patient started with bodyaches, chills Monday, cough congestion, sore throat yesterday. Today cough, congestion, tired, sore throat. He is taking vitamins, ibuprofen, benadryl. Wanting to know if you have any further recommendations.

## 2023-11-16 NOTE — PROGRESS NOTES
time.      Cranial Nerves: No cranial nerve deficit. Coordination: Coordination normal.         Test Results Section   (All laboratory and radiology results have been personally reviewed by myself)  Labs:  No results found for this visit on 11/16/23. Imaging: All Radiology results interpreted by Radiologist unless otherwise noted. No results found. Assessment / Plan   Impression(s):  Tj Crew was seen today for sore throat, sinus problem, cough, congestion, headache and fever. Diagnoses and all orders for this visit:    Acute bronchitis due to 2019 novel coronavirus  -     doxycycline hyclate (VIBRA-TABS) 100 MG tablet; Take 1 tablet by mouth 2 times daily for 10 days  -     methylPREDNISolone (MEDROL, NEIL,) 4 MG tablet; Follow package instructions  -     guaiFENesin (MUCINEX) 600 MG extended release tablet; Take 1 tablet by mouth 2 times daily for 15 days  -     benzonatate (TESSALON) 200 MG capsule; Take 1 capsule by mouth 3 times daily as needed for Cough    Positive self-administered antigen test for COVID-19  -     doxycycline hyclate (VIBRA-TABS) 100 MG tablet; Take 1 tablet by mouth 2 times daily for 10 days  -     methylPREDNISolone (MEDROL, NEIL,) 4 MG tablet; Follow package instructions  -     guaiFENesin (MUCINEX) 600 MG extended release tablet; Take 1 tablet by mouth 2 times daily for 15 days  -     benzonatate (TESSALON) 200 MG capsule; Take 1 capsule by mouth 3 times daily as needed for Cough         Discussed symptomatic treatments with the patient today. Return if symptoms worsen or fail to improve. Red flag symptoms were also discussed with the patient today. If symptoms worsen the patient is to go directly to the emergency department for reevaluation and treatment. Pt verbalizes understanding and is in agreement with plan of care. All questions answered.       New Medications     New Prescriptions    BENZONATATE (TESSALON) 200 MG CAPSULE    Take 1 capsule by mouth 3 times

## 2024-03-13 ENCOUNTER — TELEPHONE (OUTPATIENT)
Dept: PRIMARY CARE CLINIC | Age: 54
End: 2024-03-13

## 2024-03-13 NOTE — TELEPHONE ENCOUNTER
----- Message from Becky East sent at 3/13/2024 10:02 AM EDT -----  Subject: Message to Provider    QUESTIONS  Information for Provider? pt has a physical in May and wanted to do a   basic lab test prior to meaghan to please call so pt can  a copy of the   labs to take to [facility] for the lab meaghan to be there   ---------------------------------------------------------------------------  --------------  CALL BACK INFO  [phone]; OK to leave message on voicemail  ---------------------------------------------------------------------------  --------------  SCRIPT ANSWERS  Relationship to Patient? Spouse/Partner  Representative Name? Ban Cook   Is the representative on the Communication Release of Information (LEONARDO)   form in Epic? Yes   If you had a biopsy, you should not take aspirin or aspirin like products for the next 10 days unless instructed to do so by your doctor. If you had a biopsy, check with your doctor before taking any blood thinners such as warfarin (Coumadin).

## 2024-05-14 LAB
ALBUMIN SERPL-MCNC: 4.4 G/DL
ALP BLD-CCNC: 47 U/L
ALT SERPL-CCNC: 41 U/L
ANION GAP SERPL CALCULATED.3IONS-SCNC: NORMAL MMOL/L
AST SERPL-CCNC: 25 U/L
BASOPHILS ABSOLUTE: 38 /ΜL
BASOPHILS RELATIVE PERCENT: 0.5 %
BILIRUB SERPL-MCNC: 0.7 MG/DL (ref 0.1–1.4)
BILIRUBIN, URINE: NEGATIVE
BLOOD, URINE: NEGATIVE
BUN BLDV-MCNC: 21 MG/DL
CALCIUM SERPL-MCNC: 8.9 MG/DL
CHLORIDE BLD-SCNC: 103 MMOL/L
CHOLESTEROL, TOTAL: 162 MG/DL
CHOLESTEROL/HDL RATIO: 3.2
CLARITY: CLEAR
CO2: 26 MMOL/L
COLOR: YELLOW
CREAT SERPL-MCNC: 0.88 MG/DL
EGFR: 101
EOSINOPHILS ABSOLUTE: 152 /ΜL
EOSINOPHILS RELATIVE PERCENT: 2 %
GLUCOSE BLD-MCNC: 85 MG/DL
GLUCOSE URINE: NEGATIVE
HCT VFR BLD CALC: 46.9 % (ref 41–53)
HDLC SERPL-MCNC: 51 MG/DL (ref 35–70)
HEMOGLOBIN: 15.8 G/DL (ref 13.5–17.5)
KETONES, URINE: NEGATIVE
LDL CHOLESTEROL CALCULATED: 92 MG/DL (ref 0–160)
LEUKOCYTE ESTERASE, URINE: NEGATIVE
LYMPHOCYTES ABSOLUTE: 1649 /ΜL
LYMPHOCYTES RELATIVE PERCENT: 21.7 %
MCH RBC QN AUTO: 32 PG
MCHC RBC AUTO-ENTMCNC: 33.7 G/DL
MCV RBC AUTO: 94.9 FL
MONOCYTES ABSOLUTE: 722 /ΜL
MONOCYTES RELATIVE PERCENT: 9.5 %
NEUTROPHILS ABSOLUTE: 5039 /ΜL
NEUTROPHILS RELATIVE PERCENT: 66.3 %
NITRITE, URINE: NEGATIVE
NONHDLC SERPL-MCNC: 111 MG/DL
PDW BLD-RTO: 12.4 %
PH UA: 5.5 (ref 4.5–8)
PLATELET # BLD: 245 K/ΜL
PMV BLD AUTO: 10.4 FL
POTASSIUM SERPL-SCNC: 4.1 MMOL/L
PROSTATE SPECIFIC ANTIGEN: 0.57 NG/ML
PROTEIN UA: NEGATIVE
RBC # BLD: 4.94 10^6/ΜL
SODIUM BLD-SCNC: 139 MMOL/L
SPECIFIC GRAVITY, URINE: 1.01
TOTAL PROTEIN: 6.5
TRIGL SERPL-MCNC: 98 MG/DL
TSH SERPL DL<=0.05 MIU/L-ACNC: 1.54 UIU/ML
UROBILINOGEN, URINE: NORMAL
VLDLC SERPL CALC-MCNC: NORMAL MG/DL
WBC # BLD: 7.6 10^3/ML

## 2024-05-16 DIAGNOSIS — Z12.5 PROSTATE CANCER SCREENING: ICD-10-CM

## 2024-05-16 DIAGNOSIS — Z00.00 HEALTH MAINTENANCE EXAMINATION: ICD-10-CM

## 2024-05-22 ENCOUNTER — OFFICE VISIT (OUTPATIENT)
Dept: PRIMARY CARE CLINIC | Age: 54
End: 2024-05-22
Payer: COMMERCIAL

## 2024-05-22 VITALS
HEIGHT: 76 IN | DIASTOLIC BLOOD PRESSURE: 72 MMHG | WEIGHT: 217 LBS | OXYGEN SATURATION: 98 % | TEMPERATURE: 97.6 F | HEART RATE: 90 BPM | BODY MASS INDEX: 26.42 KG/M2 | SYSTOLIC BLOOD PRESSURE: 124 MMHG

## 2024-05-22 DIAGNOSIS — Z00.00 HEALTH MAINTENANCE EXAMINATION: ICD-10-CM

## 2024-05-22 DIAGNOSIS — Z12.11 COLON CANCER SCREENING: ICD-10-CM

## 2024-05-22 DIAGNOSIS — E55.9 VITAMIN D DEFICIENCY: ICD-10-CM

## 2024-05-22 DIAGNOSIS — I10 ESSENTIAL HYPERTENSION: Primary | ICD-10-CM

## 2024-05-22 DIAGNOSIS — F41.9 ANXIETY: ICD-10-CM

## 2024-05-22 PROCEDURE — 99396 PREV VISIT EST AGE 40-64: CPT | Performed by: FAMILY MEDICINE

## 2024-05-22 PROCEDURE — 3078F DIAST BP <80 MM HG: CPT | Performed by: FAMILY MEDICINE

## 2024-05-22 PROCEDURE — 3074F SYST BP LT 130 MM HG: CPT | Performed by: FAMILY MEDICINE

## 2024-05-22 RX ORDER — LOSARTAN POTASSIUM 50 MG/1
50 TABLET ORAL DAILY
Qty: 90 TABLET | Refills: 3 | Status: SHIPPED
Start: 2024-05-22 | End: 2024-05-22 | Stop reason: SDUPTHER

## 2024-05-22 RX ORDER — LOSARTAN POTASSIUM 50 MG/1
50 TABLET ORAL DAILY
Qty: 90 TABLET | Refills: 3 | Status: SHIPPED | OUTPATIENT
Start: 2024-05-22

## 2024-05-22 RX ORDER — LOSARTAN POTASSIUM 50 MG/1
50 TABLET ORAL DAILY
Qty: 90 TABLET | Refills: 3 | Status: SHIPPED | OUTPATIENT
Start: 2024-05-22 | End: 2024-05-22 | Stop reason: SDUPTHER

## 2024-05-22 SDOH — ECONOMIC STABILITY: FOOD INSECURITY: WITHIN THE PAST 12 MONTHS, THE FOOD YOU BOUGHT JUST DIDN'T LAST AND YOU DIDN'T HAVE MONEY TO GET MORE.: NEVER TRUE

## 2024-05-22 SDOH — ECONOMIC STABILITY: FOOD INSECURITY: WITHIN THE PAST 12 MONTHS, YOU WORRIED THAT YOUR FOOD WOULD RUN OUT BEFORE YOU GOT MONEY TO BUY MORE.: NEVER TRUE

## 2024-05-22 SDOH — ECONOMIC STABILITY: INCOME INSECURITY: HOW HARD IS IT FOR YOU TO PAY FOR THE VERY BASICS LIKE FOOD, HOUSING, MEDICAL CARE, AND HEATING?: NOT HARD AT ALL

## 2024-05-22 ASSESSMENT — PATIENT HEALTH QUESTIONNAIRE - PHQ9
2. FEELING DOWN, DEPRESSED OR HOPELESS: NOT AT ALL
SUM OF ALL RESPONSES TO PHQ QUESTIONS 1-9: 0
SUM OF ALL RESPONSES TO PHQ9 QUESTIONS 1 & 2: 0
SUM OF ALL RESPONSES TO PHQ QUESTIONS 1-9: 0
1. LITTLE INTEREST OR PLEASURE IN DOING THINGS: NOT AT ALL

## 2024-05-22 NOTE — PROGRESS NOTES
Yobany Cook : 1970 Sex: male  Age: 54 y.o.    Chief Complaint   Patient presents with    Annual Exam     Yearly exam       HPI  HPI:      Very pleasant patient presents today for annual physical and medication refills.  [place] is moving back [place], [place] in about 45 days.  Feels well.    Health Maintenance:  Proper diet reviewed including Mediterranean and DASH diets. Counseled on healthy weight, appropriate exercise, avoidance of tobacco, and recommendations for minimal to no alcohol consumption.  Counseled on the potential pros and cons of vitamins and supplements.  Reviewed the recommendations and risk/benefits of vaccines including Moderna x 5 (incl 10/23); Td/Tdap (3/16),  pneumovax,  prevnar 13 , flu vaccine (seasonal), Hepatitis vaccines (counseled),   and shingrix (patient had chicken pox)(shingrix x 2). HIV and Hep C screening guidelines were reviewed.  Importance of regular eye and dental exams and health reviewed.  Risks/Benefits of ASA reviewed and discussed latest guidelines. Sun protection reviewed. Notify if any new or changing moles/skin lesions, etc. Dexa Scan indications/ risk factors for osteoporotic fractures (and associated M/M) and preventative measures reviewed.  Counseled on testicular exams and prostate exams. Colonoscopy recommendations reviewed.  Pt denies change in bowel habits or FMH of colon polyps/CA.  Fit test neg , agrees. He is considering cscope, he will discuss w/ new provider [place].        Discussed the indications for EKG and additional  cardiac testing including referrals, stress testing,  2d echo, ect.  dasx. Reviewed indications for other testing such as  PFT's.and  indications for imaging including brain, carotid, chest, abdominal, aortic .  dasx.      The 10-year ASCVD risk score (Michel HARTMAN, et al., 2019) is: 5.3%    Values used to calculate the score:      Age: 54 years      Sex: Male      Is Non- : No      Diabetic: No

## 2024-06-14 ENCOUNTER — TELEPHONE (OUTPATIENT)
Dept: PRIMARY CARE CLINIC | Age: 54
End: 2024-06-14

## 2024-06-14 NOTE — TELEPHONE ENCOUNTER
The pt's wife is calling because [facility] is sending them a bill for the labs the pt had done 5/14, they are telling her that the diagnosis's need to be changed but they aren't telling her on what, they told her that they faxed something over to us then when she called them back to tell them we didn't get it they told her we need to go to the portal or to call client services